# Patient Record
Sex: FEMALE | Race: WHITE
[De-identification: names, ages, dates, MRNs, and addresses within clinical notes are randomized per-mention and may not be internally consistent; named-entity substitution may affect disease eponyms.]

---

## 2019-03-14 ENCOUNTER — HOSPITAL ENCOUNTER (INPATIENT)
Dept: HOSPITAL 96 - M.ERS | Age: 24
LOS: 2 days | Discharge: LEFT BEFORE DISCHARGE | DRG: 872 | End: 2019-03-16
Attending: INTERNAL MEDICINE | Admitting: INTERNAL MEDICINE
Payer: COMMERCIAL

## 2019-03-14 VITALS — HEIGHT: 65 IN | WEIGHT: 160.01 LBS | BODY MASS INDEX: 26.66 KG/M2

## 2019-03-14 VITALS — SYSTOLIC BLOOD PRESSURE: 109 MMHG | DIASTOLIC BLOOD PRESSURE: 72 MMHG

## 2019-03-14 VITALS — SYSTOLIC BLOOD PRESSURE: 129 MMHG | DIASTOLIC BLOOD PRESSURE: 65 MMHG

## 2019-03-14 DIAGNOSIS — N12: ICD-10-CM

## 2019-03-14 DIAGNOSIS — E87.6: ICD-10-CM

## 2019-03-14 DIAGNOSIS — J10.1: ICD-10-CM

## 2019-03-14 DIAGNOSIS — Z53.21: ICD-10-CM

## 2019-03-14 DIAGNOSIS — F17.210: ICD-10-CM

## 2019-03-14 DIAGNOSIS — E44.1: ICD-10-CM

## 2019-03-14 DIAGNOSIS — A41.50: Primary | ICD-10-CM

## 2019-03-14 DIAGNOSIS — F13.10: ICD-10-CM

## 2019-03-14 DIAGNOSIS — Z79.899: ICD-10-CM

## 2019-03-14 DIAGNOSIS — F15.10: ICD-10-CM

## 2019-03-14 LAB
ABSOLUTE MONOCYTES: 0.7 THOU/UL (ref 0–1.2)
ALBUMIN SERPL-MCNC: 3.2 G/DL (ref 3.4–5)
ALP SERPL-CCNC: 85 U/L (ref 46–116)
ALT SERPL-CCNC: 16 U/L (ref 30–65)
AMP/METHAMP: POSITIVE
ANION GAP SERPL CALC-SCNC: 6 MMOL/L (ref 7–16)
AST SERPL-CCNC: 11 U/L (ref 15–37)
BENZODIAZ UR-MCNC: POSITIVE UG/L
BILIRUB SERPL-MCNC: 0.4 MG/DL
BILIRUB UR-MCNC: NEGATIVE MG/DL
BUN SERPL-MCNC: 11 MG/DL (ref 7–18)
CALCIUM SERPL-MCNC: 8.3 MG/DL (ref 8.5–10.1)
CHLORIDE SERPL-SCNC: 100 MMOL/L (ref 98–107)
CO2 SERPL-SCNC: 31 MMOL/L (ref 21–32)
COLOR UR: YELLOW
CREAT SERPL-MCNC: 1 MG/DL (ref 0.6–1.3)
GLUCOSE SERPL-MCNC: 110 MG/DL (ref 70–99)
GRANULOCYTES NFR BLD MANUAL: 91 %
HCT VFR BLD CALC: 40.6 % (ref 37–47)
HGB BLD-MCNC: 13.9 GM/DL (ref 12–15)
KETONES UR STRIP-MCNC: (no result) MG/DL
LIPASE: 100 U/L (ref 73–393)
LYMPHOCYTES # BLD: 0.8 THOU/UL (ref 0.8–5.3)
LYMPHOCYTES NFR BLD AUTO: 5 %
MCH RBC QN AUTO: 31.8 PG (ref 26–34)
MCHC RBC AUTO-ENTMCNC: 34.2 G/DL (ref 28–37)
MCV RBC: 93 FL (ref 80–100)
MONOCYTES NFR BLD: 4 %
MPV: 7.1 FL. (ref 7.2–11.1)
NEUTROPHILS # BLD: 14.9 THOU/UL (ref 1.6–8.1)
NUCLEATED RBCS: 0 /100WBC
PLATELET # BLD EST: ADEQUATE 10*3/UL
PLATELET COUNT*: 339 THOU/UL (ref 150–400)
POTASSIUM SERPL-SCNC: 3.2 MMOL/L (ref 3.5–5.1)
PROT SERPL-MCNC: 7.4 G/DL (ref 6.4–8.2)
PROT UR QL STRIP: (no result)
RBC # BLD AUTO: 4.36 MIL/UL (ref 4.2–5)
RBC # UR STRIP: (no result) /UL
RBC #/AREA URNS HPF: (no result) /HPF (ref 0–2)
RBC MORPH BLD: NORMAL
RDW-CV: 12.8 % (ref 10.5–14.5)
SODIUM SERPL-SCNC: 137 MMOL/L (ref 136–145)
SP GR UR STRIP: <= 1.005 (ref 1–1.03)
SQUAMOUS: (no result) /LPF (ref 0–3)
URINE CLARITY: (no result)
URINE GLUCOSE-RANDOM: NEGATIVE
URINE LEUKOCYTES-REFLEX: (no result)
URINE NITRITE-REFLEX: POSITIVE
URINE WBC-REFLEX: (no result) /HPF (ref 0–5)
UROBILINOGEN UR STRIP-ACNC: 0.2 E.U./DL (ref 0.2–1)
WBC # BLD AUTO: 16.4 THOU/UL (ref 4–11)

## 2019-03-15 VITALS — SYSTOLIC BLOOD PRESSURE: 79 MMHG | DIASTOLIC BLOOD PRESSURE: 39 MMHG

## 2019-03-15 VITALS — SYSTOLIC BLOOD PRESSURE: 103 MMHG | DIASTOLIC BLOOD PRESSURE: 60 MMHG

## 2019-03-15 VITALS — SYSTOLIC BLOOD PRESSURE: 89 MMHG | DIASTOLIC BLOOD PRESSURE: 36 MMHG

## 2019-03-15 VITALS — DIASTOLIC BLOOD PRESSURE: 66 MMHG | SYSTOLIC BLOOD PRESSURE: 159 MMHG

## 2019-03-15 VITALS — SYSTOLIC BLOOD PRESSURE: 97 MMHG | DIASTOLIC BLOOD PRESSURE: 54 MMHG

## 2019-03-15 VITALS — DIASTOLIC BLOOD PRESSURE: 57 MMHG | SYSTOLIC BLOOD PRESSURE: 106 MMHG

## 2019-03-15 VITALS — SYSTOLIC BLOOD PRESSURE: 110 MMHG | DIASTOLIC BLOOD PRESSURE: 64 MMHG

## 2019-03-15 VITALS — SYSTOLIC BLOOD PRESSURE: 95 MMHG | DIASTOLIC BLOOD PRESSURE: 48 MMHG

## 2019-03-15 LAB
ANION GAP SERPL CALC-SCNC: 7 MMOL/L (ref 7–16)
BUN SERPL-MCNC: 11 MG/DL (ref 7–18)
CALCIUM SERPL-MCNC: 7.5 MG/DL (ref 8.5–10.1)
CHLORIDE SERPL-SCNC: 106 MMOL/L (ref 98–107)
CO2 SERPL-SCNC: 27 MMOL/L (ref 21–32)
CREAT SERPL-MCNC: 0.9 MG/DL (ref 0.6–1.3)
GLUCOSE SERPL-MCNC: 116 MG/DL (ref 70–99)
MAGNESIUM SERPL-MCNC: 1.6 MG/DL (ref 1.8–2.4)
POTASSIUM SERPL-SCNC: 3.7 MMOL/L (ref 3.5–5.1)
SODIUM SERPL-SCNC: 140 MMOL/L (ref 136–145)

## 2019-03-15 NOTE — EKG
[place] [address]
Phone:  [phone]                     ELECTROCARDIOGRAM REPORT      
_______________________________________________________________________________
 
Name:       RITA BAKER             Room:           [address]    ADM IN  
[facility].#:  [id number]      Account #:      [id number]  
Admission:  19     Attend Phys:    Zeeshan Bone MD 
Discharge:               Date of Birth:  [birth year]  
         Report #: [id number]
    [id number]
_______________________________________________________________________________
THIS REPORT FOR:  //name//                      
 
                         [facility] ED
                                       
Test Date:    2019               Test Time:    23:13:00
Pat Name:     RIAT BAKER            Department:   
Patient ID:   [id number]            Room:         [facility]
Gender:       F                        Technician:   DRISS
:          1995               Requested By: Mary Jo Molina
Order Number: [id number] MD:   Lan Ugalde
                                 Measurements
Intervals                              Axis          
Rate:         115                      P:            13
DE:           111                      QRS:          77
QRSD:         78                       T:            48
QT:           305                                    
QTc:          422                                    
                           Interpretive Statements
Sinus tachycardia
No previous ECG available for comparison
 
Electronically Signed On 3- 10:46:40 CDT by Lan Ugalde
[email or web address]
 
 
 
 
 
 
 
 
 
 
 
 
 
 
 
 
 
 
 
 
  <ELECTRONICALLY SIGNED>
                                           By: Lan Ugalde MD, [facility]      
  03/15/19     1046
D: 19 2313   _____________________________________
T: 19   Lan Ugalde MD, FACC        /EPI

## 2019-03-15 NOTE — NUR
VSS-101.6 THIS AM, NO FURTHER ELEVATION OF TEMP TODAY.  FLAT AFFECT, HAS TO BE
COAXXED INTO ANSWERING SIMPLE YES AND NO QUSTIONS.  POOR APPETITE.  OOB WITH
SBA TO USE RESTROOM, VOIDING WITHOUT DIFFICULTY.

## 2019-03-15 NOTE — NUR
Nutrition: Pt was sound asleep at time of visit. Physician indicated mild PCM
- defer. Pt eating regular diet. Supplement ordered for all meals - RD will
order Ensure high protein. Labs, RX, Hx noted. Wt: 160#. Mild nutrition risk.

## 2019-03-15 NOTE — NUR
ASSESSMENT:
PT REMAIN ALERT AND ORIENT TIMES FOUR. UP AD JULIO CÉSAR. STATE THAT MORPHINE THAT WAS
GIVEN IN ED HAS HELPED ABDOMINAL PAIN.  HAS NOT REQUESTED PRN PAIN MEDS THUS
FAR. S/O IN THE ROOM. SR-ST PER MONITOR. DID EAT A BOX LUNCH UPON ARRIVAL TO
THE UNIT.  UDS + FOR METH AND BENZO'S.  IVF INFUSING WILL VIA RAC. BLOOD
PRESSURE RUNNING LOW 79-82/36-70'S. TWO LITERS OF NS WAS INFUSED, AND THEN NS
RUNINING AT 100ML/HR. PT IS ASYMPTOMATIC WITH LOW BP.  WILL CONTINUE TO
MONITOR.

## 2019-03-16 VITALS — DIASTOLIC BLOOD PRESSURE: 47 MMHG | SYSTOLIC BLOOD PRESSURE: 89 MMHG

## 2019-03-16 VITALS — SYSTOLIC BLOOD PRESSURE: 110 MMHG | DIASTOLIC BLOOD PRESSURE: 58 MMHG

## 2019-03-16 VITALS — SYSTOLIC BLOOD PRESSURE: 126 MMHG | DIASTOLIC BLOOD PRESSURE: 58 MMHG

## 2019-03-16 VITALS — SYSTOLIC BLOOD PRESSURE: 109 MMHG | DIASTOLIC BLOOD PRESSURE: 63 MMHG

## 2019-03-16 LAB
ANION GAP SERPL CALC-SCNC: 6 MMOL/L (ref 7–16)
BUN SERPL-MCNC: 7 MG/DL (ref 7–18)
CALCIUM SERPL-MCNC: 8.1 MG/DL (ref 8.5–10.1)
CHLORIDE SERPL-SCNC: 108 MMOL/L (ref 98–107)
CO2 SERPL-SCNC: 28 MMOL/L (ref 21–32)
CREAT SERPL-MCNC: 0.8 MG/DL (ref 0.6–1.3)
GLUCOSE SERPL-MCNC: 101 MG/DL (ref 70–99)
HCT VFR BLD CALC: 36.8 % (ref 37–47)
HGB BLD-MCNC: 12.4 GM/DL (ref 12–15)
MAGNESIUM SERPL-MCNC: 2.1 MG/DL (ref 1.8–2.4)
MCH RBC QN AUTO: 31.9 PG (ref 26–34)
MCHC RBC AUTO-ENTMCNC: 33.8 G/DL (ref 28–37)
MCV RBC: 94.5 FL (ref 80–100)
MPV: 8 FL. (ref 7.2–11.1)
PLATELET COUNT*: 280 THOU/UL (ref 150–400)
POTASSIUM SERPL-SCNC: 4.2 MMOL/L (ref 3.5–5.1)
RBC # BLD AUTO: 3.89 MIL/UL (ref 4.2–5)
RDW-CV: 12.6 % (ref 10.5–14.5)
SODIUM SERPL-SCNC: 142 MMOL/L (ref 136–145)
WBC # BLD AUTO: 9.4 THOU/UL (ref 4–11)

## 2019-03-16 NOTE — NUR
PATIENT ASKED TO LEAVE TO THE FLOOR TO GO OUTSIDE, EXPLAINED THAT LEAVING THE
FLOOR IS NOT ALLOWED AND THAT WALKING AROUND THE FLOOR IS OKAY. PATIENT SAID
THAT "I WILL JUST WALK AROUND SPREADING THE FLU TO EVERY ONE THEN." TOLD
PATIENT TO LEAVE MASK ON AS SHE WALKED. PATIENT AND BOYFRIEND WALKED AWAY AND
GOT ON THE ELEVATOR AND LEFT THE FLOOR. SECURITY CALLED, SUPERVISOR NOTIFIED
AND PROVIDER AS WELL. PATIENT HAS STILL NOT RETURNED TO THE FLOOR. SOME
CLOTHES WERE LEFT IN THE BATHROOM BUT MOST OF THE BELONGINGS WHERE IN BAGS
THAT THE BOYFRIEND CARRIED OUT. PATIENT STILL HAS IV IN PLACE. TRIED TO CALL
BOYFRIEND NO ANSWER, TRIED TO CALL PATIENT WITH NO ANSWER, WAS ABLE TO GET
AHOLD OF AN AUNT. AUNT SAID "I WILL TRY TO GET A HOLD OF HER". [employer] NOTIFIED.

## 2019-03-16 NOTE — NUR
ASSESSMENT:
PT REMAIN ALERT AND ORIENT, SLEEPY AND NOT VERY COOPERATIVE. PT C/O HAVING A
HEADACHE. TYLENOL WAS GIVEN WITH PARTIAL RESULTS.  NASAL SAB WAS POSITIVE FOR
INFLUENZA. ISOLATION FOR AIRBORNE CONTACT INITIATED. TAMAFLU WILL BEGIN IN THE
AM TIMES FIVE DAYS. S/O AT THE BEDSIDE, INFORMED ABOUT HAND WASHING, MASK, AND
KEEPING THE DOOR CLOSED. PT IS VERY SLEEPY, EASY TO AROUSE. SLOW PROGRESS
TOWARD DISCHARGE GOALS. WILL CONTINUE TO MONITOR.